# Patient Record
Sex: FEMALE | Race: OTHER | NOT HISPANIC OR LATINO
[De-identification: names, ages, dates, MRNs, and addresses within clinical notes are randomized per-mention and may not be internally consistent; named-entity substitution may affect disease eponyms.]

---

## 2017-01-02 ENCOUNTER — TRANSCRIPTION ENCOUNTER (OUTPATIENT)
Age: 67
End: 2017-01-02

## 2017-01-13 ENCOUNTER — EMERGENCY (EMERGENCY)
Facility: HOSPITAL | Age: 67
LOS: 0 days | Discharge: HOME | End: 2017-01-13

## 2017-06-27 DIAGNOSIS — K21.9 GASTRO-ESOPHAGEAL REFLUX DISEASE WITHOUT ESOPHAGITIS: ICD-10-CM

## 2017-06-27 DIAGNOSIS — R51 HEADACHE: ICD-10-CM

## 2017-06-27 DIAGNOSIS — I10 ESSENTIAL (PRIMARY) HYPERTENSION: ICD-10-CM

## 2017-06-27 DIAGNOSIS — J02.9 ACUTE PHARYNGITIS, UNSPECIFIED: ICD-10-CM

## 2017-06-27 DIAGNOSIS — Z79.82 LONG TERM (CURRENT) USE OF ASPIRIN: ICD-10-CM

## 2017-06-27 DIAGNOSIS — Z86.718 PERSONAL HISTORY OF OTHER VENOUS THROMBOSIS AND EMBOLISM: ICD-10-CM

## 2020-05-07 PROBLEM — Z00.00 ENCOUNTER FOR PREVENTIVE HEALTH EXAMINATION: Status: ACTIVE | Noted: 2020-05-07

## 2024-04-27 ENCOUNTER — APPOINTMENT (OUTPATIENT)
Dept: PAIN MANAGEMENT | Facility: CLINIC | Age: 74
End: 2024-04-27
Payer: MEDICARE

## 2024-04-27 VITALS — BODY MASS INDEX: 30.12 KG/M2 | HEIGHT: 63 IN | WEIGHT: 170 LBS

## 2024-04-27 DIAGNOSIS — G89.29 PAIN IN RIGHT HIP: ICD-10-CM

## 2024-04-27 DIAGNOSIS — M25.551 PAIN IN RIGHT HIP: ICD-10-CM

## 2024-04-27 DIAGNOSIS — M25.552 PAIN IN RIGHT HIP: ICD-10-CM

## 2024-04-27 PROCEDURE — 99204 OFFICE O/P NEW MOD 45 MIN: CPT

## 2024-04-27 PROCEDURE — G2211 COMPLEX E/M VISIT ADD ON: CPT

## 2024-04-27 RX ORDER — METHYLPREDNISOLONE 4 MG/1
4 TABLET ORAL
Qty: 1 | Refills: 0 | Status: ACTIVE | COMMUNITY
Start: 2024-04-27 | End: 1900-01-01

## 2024-04-29 NOTE — HISTORY OF PRESENT ILLNESS
[FreeTextEntry1] : Ms. Grant is a 74-year-old female presenting to establish care for her lower back pain. She is here with her son today. She was diagnosed with Fibromyalgia and Trigeminal neuralgia. Over the last 3 years, she has noticed worsening lower back pain. She has recently undergone physical therapy around 4 months ago however she stopped after 2 weeks due to her pain becoming more severe after each session. Today, she is presenting with pain which is in the lower lumbar spine bilaterally. She denies any radicular component. She cannot stand up straight without pain. She has to hunch over to relieve the pain. She does note pain which refers into the left buttock and into the lateral aspect of the thigh and shin. She does note some pain in the groin and in the anterior thighs at times. She has pain with transitional movements especially when getting up from a seated position. She describes the pain as a constant stiffness, throbbing and sharp pains. She states she has been falling over a lot recently and is unable to get up without help. Pain is present daily. She has limitation of her ADLs. She uses a walker to ambulate. She denies any changes in bowel/bladder habits, fevers/chills or any recent weight loss. She is currently managing her pain with Tramadol, Nortriptyline along with Lyrica.

## 2024-04-29 NOTE — PHYSICAL EXAM
Diana Clements received a viral test for COVID-19. They were educated on isolation and quarantine as appropriate. For any symptoms, they were directed to seek care from their PCP, given contact information to establish with a doctor, directed to an urgent care or the emergency room.
[de-identified] : Lumbar Spine Exam: Inspection: erythema (-) ecchymosis (-) rashes (-) alignment: no scoliosis  Palpation: Midline lumbar tenderness:           L (-)    (-) paraspinal tenderness:                  L (-) ; R (-) sciatic nerve tenderness :             L (-) ; R (-) SI joint tenderness:                        L (-) ; R (-) GTB tenderness:                            L (-);  R (-)  Limited ROM 2/2 to pain with lumbar flexion and extension w/ rotation to R and L side  Strength: 5/5 throughout all muscle groups of the lower extremity                                     Right       Left    Hip Flexion:                (5/5)       (5/5) Quadriceps:               (5/5)       (5/5) Hamstrings:                (5/5)       (5/5) Ankle Dorsiflexion:     (5/5)       (5/5) EHL:                           (5/5)       (5/5) Ankle Plantarflexion:  (5/5)       (5/5)  Special Tests: SLR:                           R (-) ; L (-) Facet loading:            R (-) ; L (-) ALICE test:               R (-) ; L (-)  Neurologic: Light touch intact throughout LE  Reflexes normal and symmetric   Gait: mildly antalgic gait ambulates with a walker

## 2024-04-29 NOTE — DISCUSSION/SUMMARY
[de-identified] : A discussion regarding available pain management treatment options occurred with the patient. These included interventional, rehabilitative, pharmacological, and alternative modalities. We will proceed with the following:   Interventional treatment options: - Deferred for now. I advised her and her son to bring in a copy of her MRI of the lumbar spine. I will need to see the images of her MRI before proceeding with any injections.   Imagin. Ordered X-ray of the bilateral hips and pelvis due to pain and decrease in range of motion in that area to delineate a pain generator.   Medications: 1. Ordered a Methylprednisolone 21-tablet, 6-day taper pack.   - Follow up in 4 weeks.   I Alondra Contreras attest that this documentation has been prepared under the direction and in the presence of provider Dr. Jake Hernandez.  The documentation recorded by the scribe in my presence, accurately reflects the service I personally performed, and the decisions made by me with my edits as appropriate.   Jake Hernandez, DO

## 2024-04-30 ENCOUNTER — OUTPATIENT (OUTPATIENT)
Dept: OUTPATIENT SERVICES | Facility: HOSPITAL | Age: 74
LOS: 1 days | End: 2024-04-30
Payer: MEDICARE

## 2024-04-30 ENCOUNTER — RESULT REVIEW (OUTPATIENT)
Age: 74
End: 2024-04-30

## 2024-04-30 DIAGNOSIS — M25.551 PAIN IN RIGHT HIP: ICD-10-CM

## 2024-04-30 PROCEDURE — 73522 X-RAY EXAM HIPS BI 3-4 VIEWS: CPT | Mod: 26

## 2024-04-30 PROCEDURE — 73522 X-RAY EXAM HIPS BI 3-4 VIEWS: CPT

## 2024-05-01 DIAGNOSIS — M25.551 PAIN IN RIGHT HIP: ICD-10-CM

## 2024-05-17 ENCOUNTER — APPOINTMENT (OUTPATIENT)
Dept: NEUROLOGY | Facility: CLINIC | Age: 74
End: 2024-05-17
Payer: MEDICARE

## 2024-05-17 VITALS
DIASTOLIC BLOOD PRESSURE: 86 MMHG | HEIGHT: 65 IN | HEART RATE: 85 BPM | BODY MASS INDEX: 28.32 KG/M2 | SYSTOLIC BLOOD PRESSURE: 159 MMHG | WEIGHT: 170 LBS

## 2024-05-17 VITALS — HEIGHT: 61 IN | BODY MASS INDEX: 32.12 KG/M2

## 2024-05-17 PROCEDURE — 99204 OFFICE O/P NEW MOD 45 MIN: CPT

## 2024-05-17 PROCEDURE — G2211 COMPLEX E/M VISIT ADD ON: CPT

## 2024-05-17 NOTE — HISTORY OF PRESENT ILLNESS
[FreeTextEntry1] : It's a pleasure to see Ms. BRANDO TIERNEY In the office today. She is a 74 year -  old none English speaking woman who presents to the office today for the evaluation of left-sided trigeminal neuralgia, chronic cervical/lumbar radiculopathy, cerebral meningioma status post resection over 20 years ago.  History is taken through her son as the .  Patient normally lives in [place] and has follow-up with a neurologist for the last few years for workup and treatment.  She had MRI of the cervical/lumbar spine as well as EMG/NCS of the upper extremities done over 3 years ago, and recently had MRI of the brain just over a year ago.  The results of which are not available to us at this time she is currently on nortriptyline 50 mg nightly oxcarbazepine 150 mg twice daily, pregabalin 50 mg twice daily, tramadol 50 mg as needed.  One of the main reason she is here today is because her primary doctor is concerned that with all the medications that she is taking is responsible for her kidney dysfunction.  When asked about pain she reports that her trigeminal neuralgia is under control but her neck and back pain with pain going down her arms and legs are not and is difficult for her to ambulate.  She feels weak in the extremities.  She has seen pain management recently  Patient plans to stay in [place] long-term, and she was not satisfied with her neurologist in [place] and planning on changing neurologist anyway.

## 2024-05-17 NOTE — PHYSICAL EXAM
[Person] : oriented to person [Place] : oriented to place [Time] : oriented to time [Concentration Intact] : normal concentrating ability [Visual Intact] : visual attention was ~T not ~L decreased [Naming Objects] : no difficulty naming common objects [Repeating Phrases] : no difficulty repeating a phrase [Writing A Sentence] : no difficulty writing a sentence [Fluency] : fluency intact [Comprehension] : comprehension intact [Reading] : reading intact [Past History] : adequate knowledge of personal past history [Cranial Nerves Optic (II)] : visual acuity intact bilaterally,  visual fields full to confrontation, pupils equal round and reactive to light [Cranial Nerves Oculomotor (III)] : extraocular motion intact [Cranial Nerves Trigeminal (V)] : facial sensation intact symmetrically [Cranial Nerves Facial (VII)] : face symmetrical [Cranial Nerves Vestibulocochlear (VIII)] : hearing was intact bilaterally [Cranial Nerves Glossopharyngeal (IX)] : tongue and palate midline [Cranial Nerves Accessory (XI - Cranial And Spinal)] : head turning and shoulder shrug symmetric [Cranial Nerves Hypoglossal (XII)] : there was no tongue deviation with protrusion [Motor Tone] : muscle tone was normal in all four extremities [Motor Strength] : muscle strength was normal in all four extremities [No Muscle Atrophy] : normal bulk in all four extremities [Sensation Tactile Decrease] : light touch was intact [Past-pointing] : there was no past-pointing [Tremor] : no tremor present [2+] : Ankle jerk left 2+ [Plantar Reflex Right Only] : normal on the right [Plantar Reflex Left Only] : normal on the left [FreeTextEntry8] : Antalgic gait, walks with walker

## 2024-05-17 NOTE — ASSESSMENT
[FreeTextEntry1] : Trigeminal neuralgia - Patient will obtain prior MRI of the brain result for us for review - Continue with current medications  Chronic cervical/lumbar radiculopathy - MRI of the cervical/lumbar spine without gadolinium - EMG/NCS of both upper and lower extremities for further evaluation - Follow-up with pain management  I had a long discussion with the son regarding the issue of medication reduction.  I told him that since her pain has not been well-controlled on these medications, taking medication away with just make her pain worse or intolerable.  And if her pain worsens, I will refer her to pain management for pain control anyway, therefore it makes more sense for the pain management doctor to reduce her medications while managing her pain at the same time.  Son is agreeable to the plan.  Total clinician time spent  is  46 minutes including preparing to see the patient, obtaining and/or reviewing and confirming history, performing a medically necessary and appropriate examination, counseling and educating the patient and/or family, documenting clinical information in the HER and communicating and/or referring to other healthcare professionals.

## 2024-05-24 ENCOUNTER — APPOINTMENT (OUTPATIENT)
Dept: PAIN MANAGEMENT | Facility: CLINIC | Age: 74
End: 2024-05-24
Payer: MEDICARE

## 2024-05-24 PROCEDURE — 99214 OFFICE O/P EST MOD 30 MIN: CPT

## 2024-05-28 ENCOUNTER — APPOINTMENT (OUTPATIENT)
Dept: NEUROLOGY | Facility: CLINIC | Age: 74
End: 2024-05-28
Payer: MEDICARE

## 2024-05-28 PROCEDURE — 95911 NRV CNDJ TEST 9-10 STUDIES: CPT

## 2024-05-28 PROCEDURE — 95912 NRV CNDJ TEST 11-12 STUDIES: CPT

## 2024-05-28 PROCEDURE — 95886 MUSC TEST DONE W/N TEST COMP: CPT

## 2024-05-28 NOTE — PHYSICAL EXAM
[de-identified] : Low back exam   No rashes, erythema, edema noted TTP bilateral si joint Positive ALICE test bilaterally Positive Gaenslen's test bilaterally Positive SI joint compression test bilaterally LLE: 5/5 strength RLE: 5/5 strength Sensation intact b/l LE

## 2024-05-28 NOTE — HISTORY OF PRESENT ILLNESS
[FreeTextEntry1] : Ms. Grant is a 74-year-old female presenting to establish care for her lower back pain. She is here with her son today. She was diagnosed with Fibromyalgia and Trigeminal neuralgia. Over the last 3 years, she has noticed worsening lower back pain. She has recently undergone physical therapy around 4 months ago however she stopped after 2 weeks due to her pain becoming more severe after each session. Today, she is presenting with pain which is in the lower lumbar spine bilaterally. She denies any radicular component. She cannot stand up straight without pain. She has to hunch over to relieve the pain. She does note pain which refers into the left buttock and into the lateral aspect of the thigh and shin. She does note some pain in the groin and in the anterior thighs at times. She has pain with transitional movements especially when getting up from a seated position. She describes the pain as a constant stiffness, throbbing and sharp pains. She states she has been falling over a lot recently and is unable to get up without help. Pain is present daily. She has limitation of her ADLs. She uses a walker to ambulate. She denies any changes in bowel/bladder habits, fevers/chills or any recent weight loss. She is currently managing her pain with Tramadol, Nortriptyline along with Lyrica.   TODAY, 5-: Revisit encounter.   Since her last visit, she was given a Medrol dos godfrey for which she does note relief while taking this medication. She does continue today with ongoing weakness in the lower lumbar spine. She does note pain around the bilateral buttock area. She at times can have referred pain in the hamstring. The symptoms are made worse with sitting for prolonged periods of time or when getting up out of a seated position. She has weakness in the back and when walking. She uses a walker to ambulate.

## 2024-05-28 NOTE — DISCUSSION/SUMMARY
[de-identified] : A discussion regarding available pain management treatment options occurred with the patient. These included interventional, rehabilitative, pharmacological, and alternative modalities. We will proceed with the following:   Interventional treatment options: 1. Proceed with a Bilateral sacroiliac joint injection under fluoroscopic guidance.  Treatment options were discussed with the patient. The patient has been having persistent pain in the left sacroiliac joint with minimal improvement with conservative therapies. The patient was given the option to proceed with a left sacroiliac joint injection to try and get some pain relief. The patient understands that the injections are meant to offer pain relief but do not always give pain relief, and thus it is partially diagnostic. If this is the case, this can add to our clinical picture and we can reconsider our options at that point. The risks and benefits were discussed which included bleeding, infection, nerve injury, no pain relief, or worse - increased pain. All questions were answered and the patient will schedule for the injection on the next available date.   The patient has severe anxiety of procedures that necessitates monitored anesthesia care (MAC). The procedure performed will be close to major nerves, arteries, and spinal cord and/or joint structures. Due to the proximity of these structures, we need the patient to be still during the procedure.  With the help of MAC, this will be safely achieved and decrease the risk of any complications.   - Follow up in 2 weeks post injection.   I Alondra Contreras attest that this documentation has been prepared under the direction and in the presence of provider Dr. Jake Hernandez.  The documentation recorded by the scribe in my presence, accurately reflects the service I personally performed, and the decisions made by me with my edits as appropriate.   Jake Hernandez, DO

## 2024-05-28 NOTE — DATA REVIEWED
[FreeTextEntry1] : MRI of the cervical spine taken on 7- showed multilevel lumbar spondylosis without significant spinal canal narrowing. Degenerative disease is not significantly changed when compared to the prior exam. Stable small right foraminal disc protrusion at L5-S1.

## 2024-06-04 ENCOUNTER — APPOINTMENT (OUTPATIENT)
Dept: NEUROLOGY | Facility: CLINIC | Age: 74
End: 2024-06-04
Payer: MEDICARE

## 2024-06-04 PROCEDURE — 95912 NRV CNDJ TEST 11-12 STUDIES: CPT

## 2024-06-04 PROCEDURE — 95886 MUSC TEST DONE W/N TEST COMP: CPT

## 2024-06-06 ENCOUNTER — APPOINTMENT (OUTPATIENT)
Dept: PAIN MANAGEMENT | Facility: CLINIC | Age: 74
End: 2024-06-06

## 2024-06-06 ENCOUNTER — APPOINTMENT (OUTPATIENT)
Dept: PAIN MANAGEMENT | Facility: CLINIC | Age: 74
End: 2024-06-06
Payer: MEDICARE

## 2024-06-06 DIAGNOSIS — M46.1 SACROILIITIS, NOT ELSEWHERE CLASSIFIED: ICD-10-CM

## 2024-06-06 PROCEDURE — 27096 INJECT SACROILIAC JOINT: CPT | Mod: 50

## 2024-06-06 PROCEDURE — 01992 ANES DX/THER NRV BLK&INJ PRN: CPT | Mod: QZ,P2

## 2024-06-10 PROBLEM — M46.1 BILATERAL SACROILIITIS: Status: ACTIVE | Noted: 2024-05-24

## 2024-06-10 NOTE — PROCEDURE
[FreeTextEntry3] : Date of Service: 06/06/2024   Account: [id number]  Patient: BRANDO TIERNEY   YOB: 1950  Age: 74 year  Surgeon:      Jake Hernandez DO  Pre - Operative Diagnosis:       Bilateral sacroiliac joint dysfunction         Post - Operative Diagnosis:     Same              Procedure:             Bilateral Sacroiliac arthrogram and joint injection under fluoroscopic guidance  This procedure was carried out using fluoroscopic guidance.  The risks and benefits of the procedure were discussed extensively with the patient.  The consent of the patient was obtained and the following procedure was performed. The patient was placed in the prone position on the fluoroscopy table and the area was prepped and draped in a sterile fashion.  A timeout was performed with all essential staff present and the site and side were verified.  The patient was placed in the prone position.  The patient's back was prepped and draped in a sterile fashion.  The fluoroscopic image intensifier was then positioned micah maximize visualization of the joint.  This was achieved with slight cephalad and medial angulation.   - The area corresponding to the left inferior SIJ space was then identified and marked.  A 25 gauge 3.5 inch spinal needle was then inserted and directed into the left sacroiliac joint space using fluoroscopic guidance.  After negative aspiration for heme and CSF, 1 cc of omnipaque was injected and appeared to fill the joint space.  An injectate of 1.0 cc 0.25% Marcaine plus 10mg decadron was then injected into the left sacroiliac joint space.  - The area corresponding to the right inferior SIJ space was then identified and marked. A 25 gauge 3.5 inch spinal needle was then inserted and directed into the right sacroiliac joint space using fluoroscopic guidance.  After negative aspiration for heme and CSF, 1 cc of Omnipaque was injected and appeared to fill the joint space.  An injectate of 1.0 cc 0.25% Marcaine plus 10mg decadron was then injected into the right sacroiliac joint space.  The needle was subsequently removed.  Vital signs remained normal.  Pulse oximeter was used throughout the procedure and the patient's pulse and oxygen saturation remained within normal limits.  The patient tolerated the procedure well.  There were no complications.  The patient was instructed to apply ice over the injection sites for twenty minutes every two hours for the next 24 to 48 hours.  Disposition:        1. The patient was advised to F/U in 1-2 weeks to assess the response to the injection.       2. The patient was also instructed to contact me immediately if there were any concerns related to the procedure performed.

## 2024-06-20 ENCOUNTER — APPOINTMENT (OUTPATIENT)
Dept: NEUROLOGY | Facility: CLINIC | Age: 74
End: 2024-06-20
Payer: MEDICARE

## 2024-06-20 VITALS — SYSTOLIC BLOOD PRESSURE: 154 MMHG | DIASTOLIC BLOOD PRESSURE: 83 MMHG | HEART RATE: 105 BPM

## 2024-06-20 VITALS — HEIGHT: 63 IN | WEIGHT: 170 LBS | BODY MASS INDEX: 30.12 KG/M2

## 2024-06-20 VITALS — HEIGHT: 61 IN | BODY MASS INDEX: 32.12 KG/M2

## 2024-06-20 DIAGNOSIS — D32.0 BENIGN NEOPLASM OF CEREBRAL MENINGES: ICD-10-CM

## 2024-06-20 DIAGNOSIS — M54.12 RADICULOPATHY, CERVICAL REGION: ICD-10-CM

## 2024-06-20 DIAGNOSIS — G50.0 TRIGEMINAL NEURALGIA: ICD-10-CM

## 2024-06-20 PROCEDURE — 99214 OFFICE O/P EST MOD 30 MIN: CPT

## 2024-06-28 ENCOUNTER — APPOINTMENT (OUTPATIENT)
Dept: PAIN MANAGEMENT | Facility: CLINIC | Age: 74
End: 2024-06-28
Payer: MEDICARE

## 2024-06-28 VITALS — WEIGHT: 170 LBS | BODY MASS INDEX: 32.1 KG/M2 | HEIGHT: 61 IN

## 2024-06-28 DIAGNOSIS — M54.50 LOW BACK PAIN, UNSPECIFIED: ICD-10-CM

## 2024-06-28 DIAGNOSIS — M54.16 RADICULOPATHY, LUMBAR REGION: ICD-10-CM

## 2024-06-28 PROCEDURE — 99213 OFFICE O/P EST LOW 20 MIN: CPT
